# Patient Record
Sex: FEMALE | Race: WHITE
[De-identification: names, ages, dates, MRNs, and addresses within clinical notes are randomized per-mention and may not be internally consistent; named-entity substitution may affect disease eponyms.]

---

## 2020-09-10 ENCOUNTER — HOSPITAL ENCOUNTER (OUTPATIENT)
Dept: HOSPITAL 76 - LAB.WCP | Age: 40
Discharge: HOME | End: 2020-09-10
Attending: NURSE PRACTITIONER
Payer: MEDICARE

## 2020-09-10 DIAGNOSIS — K21.9: Primary | ICD-10-CM

## 2020-09-10 DIAGNOSIS — E66.9: ICD-10-CM

## 2020-09-10 DIAGNOSIS — Z79.899: ICD-10-CM

## 2020-09-10 DIAGNOSIS — R42: ICD-10-CM

## 2020-09-10 DIAGNOSIS — Z86.79: ICD-10-CM

## 2020-09-10 LAB
ALBUMIN DIAFP-MCNC: 4.2 G/DL (ref 3.2–5.5)
ALBUMIN/GLOB SERPL: 1.2 {RATIO} (ref 1–2.2)
ALP SERPL-CCNC: 54 IU/L (ref 42–121)
ALT SERPL W P-5'-P-CCNC: 20 IU/L (ref 10–60)
ANION GAP SERPL CALCULATED.4IONS-SCNC: 10 MMOL/L (ref 6–13)
AST SERPL W P-5'-P-CCNC: 19 IU/L (ref 10–42)
BASOPHILS NFR BLD AUTO: 0.1 10^3/UL (ref 0–0.1)
BASOPHILS NFR BLD AUTO: 0.9 %
BILIRUB BLD-MCNC: 0.4 MG/DL (ref 0.2–1)
BUN SERPL-MCNC: 15 MG/DL (ref 6–20)
CALCIUM UR-MCNC: 9.2 MG/DL (ref 8.5–10.3)
CHLORIDE SERPL-SCNC: 104 MMOL/L (ref 101–111)
CHOLEST SERPL-MCNC: 201 MG/DL
CO2 SERPL-SCNC: 22 MMOL/L (ref 21–32)
CREAT SERPLBLD-SCNC: 0.7 MG/DL (ref 0.4–1)
EOSINOPHIL # BLD AUTO: 0.1 10^3/UL (ref 0–0.7)
EOSINOPHIL NFR BLD AUTO: 0.8 %
ERYTHROCYTE [DISTWIDTH] IN BLOOD BY AUTOMATED COUNT: 15.6 % (ref 12–15)
GLOBULIN SER-MCNC: 3.4 G/DL (ref 2.1–4.2)
GLUCOSE SERPL-MCNC: 90 MG/DL (ref 70–100)
HDLC SERPL-MCNC: 57 MG/DL
HDLC SERPL: 3.5 {RATIO} (ref ?–4.4)
HGB UR QL STRIP: 11.8 G/DL (ref 12–16)
LDLC SERPL CALC-MCNC: 122 MG/DL
LDLC/HDLC SERPL: 2.1 {RATIO} (ref ?–4.4)
LYMPHOCYTES # SPEC AUTO: 2.3 10^3/UL (ref 1.5–3.5)
LYMPHOCYTES NFR BLD AUTO: 20.8 %
MCH RBC QN AUTO: 23.4 PG (ref 27–31)
MCHC RBC AUTO-ENTMCNC: 30 G/DL (ref 32–36)
MCV RBC AUTO: 77.8 FL (ref 81–99)
MONOCYTES # BLD AUTO: 0.8 10^3/UL (ref 0–1)
MONOCYTES NFR BLD AUTO: 6.8 %
NEUTROPHILS # BLD AUTO: 7.9 10^3/UL (ref 1.5–6.6)
NEUTROPHILS # SNV AUTO: 11.2 X10^3/UL (ref 4.8–10.8)
NEUTROPHILS NFR BLD AUTO: 70.4 %
PDW BLD AUTO: 11.2 FL (ref 7.9–10.8)
PLATELET # BLD: 320 10^3/UL (ref 130–450)
PROT SPEC-MCNC: 7.6 G/DL (ref 6.7–8.2)
RBC MAR: 5.05 10^6/UL (ref 4.2–5.4)
SODIUM SERPLBLD-SCNC: 136 MMOL/L (ref 135–145)
VLDLC SERPL-SCNC: 22 MG/DL

## 2020-09-10 PROCEDURE — 36415 COLL VENOUS BLD VENIPUNCTURE: CPT

## 2020-09-10 PROCEDURE — 85025 COMPLETE CBC W/AUTO DIFF WBC: CPT

## 2020-09-10 PROCEDURE — 80053 COMPREHEN METABOLIC PANEL: CPT

## 2020-09-10 PROCEDURE — 80061 LIPID PANEL: CPT

## 2020-09-10 PROCEDURE — 84443 ASSAY THYROID STIM HORMONE: CPT

## 2020-09-10 PROCEDURE — 83721 ASSAY OF BLOOD LIPOPROTEIN: CPT

## 2020-09-14 ENCOUNTER — HOSPITAL ENCOUNTER (OUTPATIENT)
Dept: HOSPITAL 76 - DI | Age: 40
Discharge: HOME | End: 2020-09-14
Attending: NURSE PRACTITIONER
Payer: COMMERCIAL

## 2020-09-14 DIAGNOSIS — M50.322: Primary | ICD-10-CM

## 2020-09-14 PROCEDURE — 72040 X-RAY EXAM NECK SPINE 2-3 VW: CPT

## 2020-09-14 NOTE — XRAY REPORT
PROCEDURE:  Cervical Spine 2 View

 

INDICATIONS:  NECK PAIN

 

TECHNIQUE:  4 view(s) of the cervical spine were acquired.  

 

COMPARISON:  None.

 

FINDINGS:  

 

Bones:  No fractures or dislocations to the T1 level.  The lateral masses of C1 appear intact on the 
odontoid view.  No suspicious bony lesions.  There is moderate C5-6 degenerative disc disease.

 

Soft tissues:  No prevertebral soft tissue swelling.  

 

IMPRESSION:  Moderate C5-6 degenerative disc disease, no fracture or subluxation present.

 

Reviewed by: Renny Blue MD on 9/14/2020 3:32 PM PDT

Approved by: Renny Blue MD on 9/14/2020 3:32 PM PDT

 

 

Station ID:  [id number]

## 2020-09-21 ENCOUNTER — HOSPITAL ENCOUNTER (OUTPATIENT)
Dept: HOSPITAL 76 - DI.N | Age: 40
Discharge: HOME | End: 2020-09-21
Attending: NURSE PRACTITIONER
Payer: MEDICARE

## 2020-09-21 DIAGNOSIS — R92.8: ICD-10-CM

## 2020-09-21 DIAGNOSIS — Z12.31: Primary | ICD-10-CM

## 2020-09-21 PROCEDURE — 77067 SCR MAMMO BI INCL CAD: CPT

## 2020-09-21 PROCEDURE — 77063 BREAST TOMOSYNTHESIS BI: CPT

## 2020-09-23 NOTE — MAMMOGRAPHY REPORT
BILATERAL DIGITAL SCREENING MAMMOGRAM 3D/2D: 9/21/2020

CLINICAL: Baseline exam.  

 

No prior exams were available for comparison.  The tissue of both breasts is predominantly fatty.  

 

 

There is a 1 cm oval focal asymmetry in the left breast central to the nipple middle depth.  

No other significant masses, calcifications, or other findings are seen in either breast.  

 

IMPRESSION: INCOMPLETE: NEEDS ADDITIONAL IMAGING EVALUATION

The 1 cm oval focal asymmetry in the left breast is indeterminate.  Additional views with possible ul
trasound are recommended.  

 

 

 

This exam was interpreted at Station ID: [id number].  

 

NOTE: For mammograms, a report in lay terms will be sent to the patient. Approximately 15% of breast 
malignancies will not be visualized mammographically. In the management of a palpable breast mass, a 
negative mammogram must not discourage biopsy of a clinically suspicious lesion.

 

Electronically Signed By: Maxx Mayo M.D.          

aty/:9/21/2020 18:39:58  

 

 

 

ACR BI-RADS Category 0: Incomplete 3340F

PARENCHYMAL PATTERN: (F) - The breast(s) demonstrate(s) diffuse fatty replacement.

BI-RADS CATEGORY: (0) - 0

Mammo and US

[id number]

Immediate follow-up

LATERALITY: (L)

## 2020-10-08 ENCOUNTER — HOSPITAL ENCOUNTER (OUTPATIENT)
Dept: HOSPITAL 76 - LAB | Age: 40
Discharge: HOME | End: 2020-10-08
Attending: NURSE PRACTITIONER
Payer: COMMERCIAL

## 2020-10-08 DIAGNOSIS — R30.0: Primary | ICD-10-CM

## 2020-10-08 DIAGNOSIS — R71.8: ICD-10-CM

## 2020-10-08 DIAGNOSIS — M54.2: ICD-10-CM

## 2020-10-08 LAB
CLARITY UR REFRACT.AUTO: (no result)
CRP SERPL-MCNC: < 1 MG/DL (ref 0–1)
GLUCOSE UR QL STRIP.AUTO: NEGATIVE MG/DL
IRON SATN MFR SERPL: 5 % (ref 20–50)
IRON SERPL-MCNC: 26 UG/DL (ref 28–170)
KETONES UR QL STRIP.AUTO: NEGATIVE MG/DL
MUCOUS THREADS URNS QL MICRO: (no result)
NITRITE UR QL STRIP.AUTO: NEGATIVE
PH UR STRIP.AUTO: 5 PH (ref 5–7.5)
PROT UR STRIP.AUTO-MCNC: NEGATIVE MG/DL
RBC # UR STRIP.AUTO: NEGATIVE /UL
RBC # URNS HPF: (no result) /HPF (ref 0–5)
SP GR UR STRIP.AUTO: >=1.03 (ref 1–1.03)
SQUAMOUS URNS QL MICRO: (no result)
TIBC SERPL-MCNC: 536 UG/DL (ref 250–450)
TRANSFERRIN SERPL-MCNC: 383 MG/DL (ref 192–382)
UROBILINOGEN UR QL STRIP.AUTO: (no result) E.U./DL
UROBILINOGEN UR STRIP.AUTO-MCNC: NEGATIVE MG/DL
YEAST URNS QL MICRO: PRESENT

## 2020-10-08 PROCEDURE — 85651 RBC SED RATE NONAUTOMATED: CPT

## 2020-10-08 PROCEDURE — 36415 COLL VENOUS BLD VENIPUNCTURE: CPT

## 2020-10-08 PROCEDURE — 86140 C-REACTIVE PROTEIN: CPT

## 2020-10-08 PROCEDURE — 83540 ASSAY OF IRON: CPT

## 2020-10-08 PROCEDURE — 82728 ASSAY OF FERRITIN: CPT

## 2020-10-08 PROCEDURE — 87086 URINE CULTURE/COLONY COUNT: CPT

## 2020-10-08 PROCEDURE — 84466 ASSAY OF TRANSFERRIN: CPT

## 2020-10-08 PROCEDURE — 81001 URINALYSIS AUTO W/SCOPE: CPT

## 2020-10-26 ENCOUNTER — HOSPITAL ENCOUNTER (OUTPATIENT)
Dept: HOSPITAL 76 - LAB | Age: 40
Discharge: HOME | End: 2020-10-26
Attending: NURSE PRACTITIONER
Payer: COMMERCIAL

## 2020-10-26 ENCOUNTER — HOSPITAL ENCOUNTER (OUTPATIENT)
Dept: HOSPITAL 76 - DI | Age: 40
Discharge: HOME | End: 2020-10-26
Attending: NURSE PRACTITIONER
Payer: COMMERCIAL

## 2020-10-26 DIAGNOSIS — R30.0: ICD-10-CM

## 2020-10-26 DIAGNOSIS — R92.8: Primary | ICD-10-CM

## 2020-10-26 DIAGNOSIS — R30.0: Primary | ICD-10-CM

## 2020-10-26 LAB
CLARITY UR REFRACT.AUTO: CLEAR
GLUCOSE UR QL STRIP.AUTO: NEGATIVE MG/DL
KETONES UR QL STRIP.AUTO: NEGATIVE MG/DL
MUCOUS THREADS URNS QL MICRO: (no result)
NITRITE UR QL STRIP.AUTO: NEGATIVE
PH UR STRIP.AUTO: 5 PH (ref 5–7.5)
PROT UR STRIP.AUTO-MCNC: NEGATIVE MG/DL
RBC # UR STRIP.AUTO: NEGATIVE /UL
SP GR UR STRIP.AUTO: >=1.03 (ref 1–1.03)
SQUAMOUS URNS QL MICRO: (no result)
UROBILINOGEN UR QL STRIP.AUTO: (no result) E.U./DL
UROBILINOGEN UR STRIP.AUTO-MCNC: NEGATIVE MG/DL

## 2020-10-26 PROCEDURE — 87086 URINE CULTURE/COLONY COUNT: CPT

## 2020-10-26 PROCEDURE — 81001 URINALYSIS AUTO W/SCOPE: CPT

## 2020-10-26 PROCEDURE — 76642 ULTRASOUND BREAST LIMITED: CPT

## 2020-10-27 NOTE — ULTRASOUND REPORT
LIMITED ULTRASOUND OF LEFT BREAST: 10/26/2020

CLINICAL: Patient returns today to evaluate a focal asymmetry in the left breast.  

 

Comparison is made to exams dated:  10/26/2020 mammogram and 9/21/2020 mammogram - [facility].  

 Real-time ultrasound of the left breast 12 o'clock region was performed.  Gray scale images of the r
eal-time examination were reviewed.  

 

No significant abnormalities were seen sonographically in the left breast. Specifically, no finding t
o correspond to the patient's less prominent screening mammographic abnormality.

 

 

 

IMPRESSION: PROBABLY BENIGN 

There is no sonographic correlate to the patient's screening abnormality and no evidence of malignanc
y.  

 A follow-up left mammogram in 6 months is recommended to demonstrate stability.   

Findings and recommendations were conveyed to the patient at time of exam.

 

 

This exam was interpreted at Station ID: [id number].  

Electronically Signed By: Keiry vega/:10/26/2020 11:07:24  

 

 

 

Ultrasound BI-RADS: 3 Probably benign

BI-RADS CATEGORY: (3) - 3

Mammogram

[id number]

6 month follow-up

LATERALITY: (L)

## 2020-10-27 NOTE — MAMMOGRAPHY REPORT
UNILATERAL LEFT DIGITAL DIAGNOSTIC MAMMOGRAM 3D/2D: 10/26/2020

CLINICAL: Patient returns today to evaluate a focal asymmetry in the left breast.  

 

Comparison is made to exam dated:  9/21/2020 mammogram - [facility].  The tissue of
 left breast is predominantly fatty.  

 

There is a 7 mm oval low density asymmetry in the left breast central to the nipple middle depth.  Th
is is seen in additional views and is less prominent compared to screening.  

No other significant masses or calcifications are seen in the breast.  

 

IMPRESSION: INCOMPLETE: NEEDS ADDITIONAL IMAGING EVALUATION

The 7 mm oval low density asymmetry in the left breast most likely is a cyst but remains indeterminat
e.  An ultrasound is recommended.  This was performed immediately following this exam. 

 

 

 

This exam was interpreted at Station ID: [id number].  

 

NOTE: For mammograms, a report in lay terms will be sent to the patient. Approximately 15% of breast 
malignancies will not be visualized mammographically. In the management of a palpable breast mass, a 
negative mammogram must not discourage biopsy of a clinically suspicious lesion.

 

Electronically Signed By: Keiry vega/:10/26/2020 10:02:29  

 

 

 

ACR BI-RADS Category 0: Incomplete 3340F

PARENCHYMAL PATTERN: (F) - The breast(s) demonstrate(s) diffuse fatty replacement.

BI-RADS CATEGORY: (0) - 0

Ultrasound

[id number]

Immediate follow-up

LATERALITY: (B)

## 2020-10-29 ENCOUNTER — HOSPITAL ENCOUNTER (OUTPATIENT)
Dept: HOSPITAL 76 - DI | Age: 40
Discharge: HOME | End: 2020-10-29
Attending: NURSE PRACTITIONER
Payer: COMMERCIAL

## 2020-10-29 DIAGNOSIS — Z86.11: ICD-10-CM

## 2020-10-29 DIAGNOSIS — R30.0: Primary | ICD-10-CM

## 2020-10-29 LAB
CLARITY UR REFRACT.AUTO: CLEAR
GLUCOSE UR QL STRIP.AUTO: NEGATIVE MG/DL
KETONES UR QL STRIP.AUTO: NEGATIVE MG/DL
NITRITE UR QL STRIP.AUTO: NEGATIVE
PH UR STRIP.AUTO: 5.5 PH (ref 5–7.5)
PROT UR STRIP.AUTO-MCNC: NEGATIVE MG/DL
RBC # UR STRIP.AUTO: (no result) /UL
RBC # URNS HPF: (no result) /HPF (ref 0–5)
SP GR UR STRIP.AUTO: >=1.03 (ref 1–1.03)
SQUAMOUS URNS QL MICRO: (no result)
UROBILINOGEN UR QL STRIP.AUTO: (no result) E.U./DL
UROBILINOGEN UR STRIP.AUTO-MCNC: NEGATIVE MG/DL

## 2020-10-29 PROCEDURE — 81001 URINALYSIS AUTO W/SCOPE: CPT

## 2020-10-29 PROCEDURE — 71046 X-RAY EXAM CHEST 2 VIEWS: CPT

## 2020-10-29 PROCEDURE — 87086 URINE CULTURE/COLONY COUNT: CPT

## 2020-10-30 NOTE — XRAY REPORT
PROCEDURE:  Chest 2 View X-Ray

 

INDICATIONS:  PERSONAL HISTORY OF TUBERCULOSIS

 

TECHNIQUE:  2 view(s) of the chest.  

 

COMPARISON:  None.

 

FINDINGS:  

 

Surgical changes and devices:  None.  

 

Lungs and pleura:  No pleural effusions or pneumothorax.  Lungs are clear.  

 

Mediastinum:  Mediastinal contours are normal.  Heart size is normal.  

 

Bones and chest wall:  No suspicious bony abnormalities.  Soft tissues appear unremarkable.  

 

IMPRESSION:  There is no evidence of prior tuberculosis, no lung scarring is present, no mediastinal 
or hilar adenopathy is found. The study is considered normal for age.

 

Reviewed by: Renny Blue MD on 10/30/2020 8:39 AM PDT

Approved by: Renny Blue MD on 10/30/2020 8:39 AM PDT

 

 

Station ID:  [id number]

## 2020-11-06 ENCOUNTER — HOSPITAL ENCOUNTER (OUTPATIENT)
Dept: HOSPITAL 76 - DI | Age: 40
Discharge: HOME | End: 2020-11-06
Attending: NURSE PRACTITIONER
Payer: COMMERCIAL

## 2020-11-06 DIAGNOSIS — M50.321: ICD-10-CM

## 2020-11-06 DIAGNOSIS — M48.02: ICD-10-CM

## 2020-11-06 DIAGNOSIS — M47.812: Primary | ICD-10-CM

## 2020-11-06 PROCEDURE — 72141 MRI NECK SPINE W/O DYE: CPT

## 2020-11-06 NOTE — MRI REPORT
PROCEDURE:  Cervical Spine W/O

 

INDICATIONS:  CERVICAL RADICULOPATHY, NECK PAIN

 

TECHNIQUE:  

Noncontrast sagittal T1 spin echo and T2 fast spin echo, sagittal STIR, foraminal oblique sagittal T2
 fast spin echo, and axial gradient echo or T2 fast spin echo through the cervical spine.  

 

COMPARISON:  Correlation is made with prior cervical spine radiographs, 9/14/2020

 

FINDINGS:  

Image quality:   Motion artifact is noted.   The oblique images are not properly oriented.

 

Alignment and Curvature:  There is normal bony alignment.  

 

Bone Marrow:  Marrow demonstrates normal overall signal.  

 

Spinal Cord:  Visualized spinal cord has normal size and signal.  No cerebellar tonsillar herniation.
  

 

Paraspinous Soft Tissues:  No paravertebral masses.  Prevertebral soft tissues are normal in thicknes
s.  

 

C2-C3:  Normal in appearance.  

 

C3-C4:   Normal in appearance.

 

C4-C5:  The disc height and disc signal are relatively well-preserved.  Mild disc osteophyte complex 
is seen.   Mild facet hypertrophy is seen.   Mild bilateral neural foraminal narrowing is seen.  Mild
 to moderate central canal narrowing is seen.

 

C5-C6:  Moderate loss of disc height and signal are seen. Moderate disc osteophyte complex is seen, w
ith a central disc osteophyte protrusion, as on series 701 image 12.  Mild facet hypertrophy is seen.
   Moderate bilateral neural foraminal narrowing is seen.   Moderate to severe central canal narrowin
g is seen.  Associated mass effect is seen upon the ventral spinal cord.  

 

C6-C7:  The disc height and disc signal are relatively well-preserved. Mild disc osteophyte complex i
s seen, with a mild central disc protrusion. No significant neuroforaminal narrowing is seen. Mild to
 moderate central canal narrowing is seen, with minimal mass effect upon the ventral spinal cord, as 
on series 7 and on one image 8.

 

C7-T1:  Normal in appearance.

 

 

 

IMPRESSION:  

 

Premature cervical spine degenerative changes are seen, which are worst at the C5-C6 level, where the
re is a central disc osteophyte protrusion, with associated moderate to severe central canal narrowin
g and ventral cord flattening.

 

Reviewed by: Jimmy Meyers MD on 11/6/2020 5:38 PM AKST

Approved by: Jimmy Meyers MD on 11/6/2020 5:38 PM AK

 

 

Station ID:  [id number]

## 2020-11-21 ENCOUNTER — HOSPITAL ENCOUNTER (EMERGENCY)
Dept: HOSPITAL 76 - ED | Age: 40
Discharge: HOME | End: 2020-11-21
Payer: COMMERCIAL

## 2020-11-21 VITALS — DIASTOLIC BLOOD PRESSURE: 64 MMHG | SYSTOLIC BLOOD PRESSURE: 115 MMHG

## 2020-11-21 DIAGNOSIS — I82.431: Primary | ICD-10-CM

## 2020-11-21 DIAGNOSIS — I82.451: ICD-10-CM

## 2020-11-21 DIAGNOSIS — I82.443: ICD-10-CM

## 2020-11-21 PROCEDURE — 93970 EXTREMITY STUDY: CPT

## 2020-11-21 PROCEDURE — 99284 EMERGENCY DEPT VISIT MOD MDM: CPT

## 2020-11-21 NOTE — ULTRASOUND REPORT
PROCEDURE:  Duplex Ext Veins Bilateral

 

INDICATIONS:  LEANNA YOVANNY

 

TECHNIQUE:  

Real-time imaging, as well as color and pulse Doppler interrogation, were performed of the deep veins
 of both legs from the inguinal ligament to the popliteal fossa.  

 

COMPARISON:  None available

 

FINDINGS: On the right, deep venous thrombosis involves the popliteal vein and extends into the poste
rior tibial veins and peroneal veins. No more proximal deep venous thrombosis is seen.

 

On the left, deep venous thrombosis is seen involving a single posterior tibial vein. No above-the-kn
ee deep venous thrombosis is seen.

 

This study is limited by patient tenderness, with inability to tolerate compressions within the calf.


 

 

 

IMPRESSION:  

 

Bilateral lower extremity venous thrombosis, which is more extensive on the right than on the left.

 

 

Reviewed by: Jimmy Meyers MD on 11/21/2020 12:20 PM [facility]

Approved by: Jimmy Meyers MD on 11/21/2020 12:20 PM [facility]

 

 

Station ID:  [id number]

## 2020-11-21 NOTE — ED PHYSICIAN DOCUMENTATION
PD HPI LOWER EXT INJURY





- Stated complaint


Stated Complaint: R LEG PAIN





- Chief complaint


Chief Complaint: Ext Problem





- History obtained from


History obtained from: Patient





- History of Present Illness


PD HPI LOW EXT INJURY LOCATION: Right, Calf


Type of injury: Other (She had had right foot surgery in August and that seemed 

to recover okay.  Left foot surgery in September and still is in a walking boot.

 Had left calf pain 4 days ago for a day and then improved.  Right calf pain now

for 3 days, increasing.)


Timing - details: Gradual onset


Worsened by: Palpating, Other (walking)


Associated symptoms: No: Weakness, Numbness, Swelling


Contributing factors: Other (She started oral contraceptives a month ago.).  No:

Anticoagulated


Similar symptoms before: Has not had sx before





Review of Systems


Constitutional: denies: Fever


Nose: denies: Rhinorrhea / runny nose, Congestion, Epistaxis


Throat: denies: Sore throat


Cardiac: reports: Calf pain.  denies: Chest pain / pressure, Palpitations, Pedal

edema


Respiratory: denies: Dyspnea, Cough, Hemoptysis


GI: denies: Nausea, Vomiting, Diarrhea, Bloody / black stool


Endocrine: denies: Weight loss, Easy bruising / bleeding





PD PAST MEDICAL HISTORY





- Past Medical History


Cardiovascular: None


Respiratory: None


Neuro: None


Endocrine/Autoimmune: None





- Past Surgical History


Ortho: Other (foot surgery September left foot by Podiatrist, right foot month 

or so prior to that. )





- Present Medications


Home Medications: 


                                Ambulatory Orders











 Medication  Instructions  Recorded  Confirmed


 


Rivaroxaban [Xarelto] 15 mg PO BID #42 tablet 11/21/20 














- Allergies


Allergies/Adverse Reactions: 


                                    Allergies











Allergy/AdvReac Type Severity Reaction Status Date / Time


 


No Known Drug Allergies Allergy   Verified 11/21/20 09:49














- Living Situation


Living Arrangement: reports: At home





- Social History


Does the pt smoke?: No


Does the pt have substance abuse?: No





- Family History


Family history: reports: Other (The patient states her mother and couple of 

other family members have had blood clots in the past.  No specific diagnosis of

 clotting abnormalities.)





PD ED PE NORMAL





- Vitals


Vital signs reviewed: Yes





- General


General: Alert and oriented X 3, No acute distress, Well developed/nourished





- Neck


Neck: Supple, no meningeal sign, No adenopathy





- Cardiac


Cardiac: RRR, No murmur





- Respiratory


Respiratory: Clear bilaterally





- Abdomen


Abdomen: Soft, Non tender





- Derm


Derm: Normal color, Warm and dry





- Extremities


Extremities: No edema, Other (Mild calf tenderness on the medial mid left calf. 

 There is moderate calf tenderness on the right from just above the ankle to the

 medial thigh.  No redness or warmth.  No edema in the leg.)





- Neuro


Neuro: Alert and oriented X 3, No motor deficit, No sensory deficit, Normal 

speech





Results





- Vitals


Vitals: 


                               Vital Signs - 24 hr











  11/21/20 11/21/20





  09:45 13:28


 


Temperature 37.1 C 36.7 C


 


Heart Rate 83 65


 


Respiratory 18 18





Rate  


 


Blood Pressure 120/67 115/64


 


O2 Saturation 99 95








                                     Oxygen











O2 Source                      Room air

















- Rads (name of study)


  ** bilateral Duplex veins


Radiology: Prelim report reviewed (DVT moderate right side. Small DVT left lower

 leg. ), See rad report





PD MEDICAL DECISION MAKING





- ED course


Complexity details: re-evaluated patient (We discussed the treatment and 

diagnosis.  I did verbally tell her to stop her oral contraceptives.  Not to use

 any aspirin or NSAIDs.  We will start the anticoagulant.  She can add Tylenol 

for pain.  She declined stronger pain meds from me and does have some opioid 

left over from her foot surgery i), considered differential, d/w patient





Departure





- Departure


Disposition: 01 Home, Self Care


Clinical Impression: 


DVT (deep venous thrombosis)


Qualifiers:


 DVT location: lower extremity Affected thrombotic vein of extremity: 

unspecified vein of extremity Chronicity: acute Laterality: bilateral Qualified 

Code(s): I82.403 - Acute embolism and thrombosis of unspecified deep veins of 

lower extremity, bilateral





Condition: Stable


Record reviewed to determine appropriate education?: Yes


Instructions:  ED DVT


Follow-Up: 


Adrianna Haider ARNP, NP-C [Primary Care Provider] - 


Prescriptions: 


Rivaroxaban [Xarelto] 15 mg PO BID #42 tablet


Comments: 


Warm moist compresses to the calves area to and promote good blood flow.  

Continue walking and regular use and activity.  Use Tylenol if needed for pains 

every 4-6 hours.





Do not use any aspirin or NSAIDs as it will add further blood thinning effect to

 the anticoagulant.





Xarelto twice daily for 3 weeks.  They will then change to once daily for that 

at a different dose.  I will leave that to your primary care to  the 

prescription at that point.





Follow-up with your primary care in the next week or so for recheck on the 

current symptoms.





Return if worsening pain or swelling in the legs or any development of chest 

pain or trouble breathing.


Discharge Date/Time: 11/21/20 13:36

## 2020-11-23 ENCOUNTER — HOSPITAL ENCOUNTER (EMERGENCY)
Dept: HOSPITAL 76 - ED | Age: 40
Discharge: HOME | End: 2020-11-23
Payer: COMMERCIAL

## 2020-11-23 VITALS — SYSTOLIC BLOOD PRESSURE: 112 MMHG | DIASTOLIC BLOOD PRESSURE: 50 MMHG

## 2020-11-23 DIAGNOSIS — Z79.01: ICD-10-CM

## 2020-11-23 DIAGNOSIS — R51.9: Primary | ICD-10-CM

## 2020-11-23 DIAGNOSIS — Z86.718: ICD-10-CM

## 2020-11-23 PROCEDURE — 99284 EMERGENCY DEPT VISIT MOD MDM: CPT

## 2020-11-23 PROCEDURE — 70450 CT HEAD/BRAIN W/O DYE: CPT

## 2020-11-23 NOTE — CT REPORT
PROCEDURE:  HEAD WO

 

INDICATIONS:  Headache, takes xarelto

 

TECHNIQUE:  

Noncontrast 4.5 mm thick angled axial sections acquired from the foramen magnum to the vertex.  For r
adiation dose reduction, the following was used:  automated exposure control, adjustment of mA and/or
 kV according to patient size.

 

COMPARISON:  None.

 

FINDINGS:  

Image quality:  Excellent.  

 

CSF spaces:  Basal cisterns are patent.  No extra-axial fluid collections.  Ventricles are normal in 
size and shape.  

 

Brain:  No midline shift.  No intracranial masses or hemorrhage.  Gray-white matter interface is norm
al.  

 

Skull and face:  Calvarium and visualized facial bones are intact, without suspicious lesions.  

 

Sinuses:  Visualized sinuses and mastoids are clear.  

 

IMPRESSION:  No acute intracranial finding.

 

Reviewed by: Brandyn Beebe MD on 11/23/2020 1:13 PM PST

Approved by: Brandyn Beebe MD on 11/23/2020 1:13 PM [facility]

 

 

Station ID:  [id number]

## 2020-11-23 NOTE — ED PHYSICIAN DOCUMENTATION
PD HPI HEADACHE





- Stated complaint


Stated Complaint: HA





- Chief complaint


Chief Complaint: Neuro





- History obtained from


History obtained from: Patient





- History of Present Illness


Timing - onset: How many days ago (2)


Timing - onset during: Rest


Pain level max: 7


Pain level now: 4


Worst headache ever?: No: Worst headache ever?


Location: Global


Quality: Aching


Associated symptoms: No: Fever, Stiff neck, Nausea, Vomiting, Weakness, Numbness


Improved by: Rest


Worsened by: Light, Noise


Recently seen: Not recently seen





- Additional information


Additional information: 





Patient is a 40-year-old female who presents with intermittent headaches for the

past 2 days since starting Xarelto.  She has also been on Vicodin for pain in 

her calves.  Patient denies any recent head injury or trauma.  She called her 

doctor who told her to come to the emergency department to have a CT scan of her

head due to the recent starting of Xarelto for calf DVTs. Worse with light and 

sound.  Similar to prior headaches. No vomiting





Review of Systems


Ten Systems: 10 systems reviewed and negative


Constitutional: denies: Fever, Chills


Ears: denies: Ear pain


Nose: denies: Rhinorrhea / runny nose, Congestion


Throat: denies: Sore throat


Respiratory: denies: Cough


GI: denies: Vomiting, Diarrhea


Skin: denies: Rash


Musculoskeletal: denies: Neck pain, Back pain


Neurologic: denies: Focal weakness, Numbness





PD PAST MEDICAL HISTORY





- Past Medical History


Past Medical History: Yes


Cardiovascular: None


Respiratory: None


Neuro: None


Endocrine/Autoimmune: None





- Past Surgical History


Ortho: Other





- Present Medications


Home Medications: 


                                Ambulatory Orders











 Medication  Instructions  Recorded  Confirmed


 


Rivaroxaban [Xarelto] 15 mg PO BID #42 tablet 11/21/20 














- Allergies


Allergies/Adverse Reactions: 


                                    Allergies











Allergy/AdvReac Type Severity Reaction Status Date / Time


 


No Known Drug Allergies Allergy   Verified 11/23/20 11:00














- Social History


Does the pt smoke?: No


Smoking Status: Never smoker


Does the pt have substance abuse?: No





PD ED PE NORMAL





- Vitals


Vital signs reviewed: Yes





- General


General: Alert and oriented X 3, No acute distress, Well developed/nourished





- HEENT


HEENT: Atraumatic, PERRL, Moist mucous membranes





- Neck


Neck: Supple, no meningeal sign





- Cardiac


Cardiac: RRR, Strong equal pulses





- Respiratory


Respiratory: No respiratory distress, Clear bilaterally





- Abdomen


Abdomen: Soft, Non tender, Non distended





- Back


Back: No spinal TTP





- Derm


Derm: Warm and dry





- Extremities


Extremities: No edema





- Neuro


Neuro: Alert and oriented X 3, CNs 2-12 intact, No motor deficit, No sensory 

deficit, Normal speech





- Psych


Psych: Normal mood, Normal affect





Results





- Vitals


Vitals: 


                               Vital Signs - 24 hr











  11/23/20 11/23/20





  10:55 13:41


 


Temperature 36.9 C 


 


Heart Rate 95 73


 


Respiratory 18 18





Rate  


 


Blood Pressure 95/76 112/50 L


 


O2 Saturation 97 100








                                     Oxygen











O2 Source                      Room air

















- Rads (name of study)


  ** head CT 


Radiology: Prelim report reviewed, EMP read contemporaneously, See rad report 

(no acute abnormality)





PD MEDICAL DECISION MAKING





- ED course


Complexity details: reviewed results, re-evaluated patient, considered 

differential, d/w patient


ED course: 





40-year-old female presents to the emergency department with a headache today.  

PCP sent her here for possible intracranial hemorrhage as she recently started 

Xarelto.  Negative head CT.  Headache resolved with Fioricet.  We will have her 

follow-up with her doctor for further care.  Patient counseled regarding signs 

and symptoms for which I believe and urgent re-evaluation would be necessary. 

Patient with good understanding of and agreement to plan and is comfortable 

going home at this time





This document was made in part using voice recognition software. While efforts 

are made to proofread this document, sound alike and grammatical errors may 

occur.





Departure





- Departure


Disposition: 01 Home, Self Care


Clinical Impression: 


Headache


Qualifiers:


 Headache type: unspecified Headache chronicity pattern: acute headache 

Intractability: not intractable Qualified Code(s): R51.9 - Headache, unspecified





Condition: Good


Instructions:  ED Cephalgia Unspecified


Follow-Up: 


Adrianna Haider ARNP, NP-C [Primary Care Provider] - Within 1 week


Comments: 


Your head CT does not show any acute abnormalities today. Return if you worsen. 

Follow up with your doctor for further care.


Discharge Date/Time: 11/23/20 13:41

## 2020-12-01 ENCOUNTER — HOSPITAL ENCOUNTER (OUTPATIENT)
Dept: HOSPITAL 76 - LAB.R | Age: 40
Discharge: HOME | End: 2020-12-01
Attending: FAMILY MEDICINE
Payer: COMMERCIAL

## 2020-12-01 DIAGNOSIS — R30.0: Primary | ICD-10-CM

## 2020-12-01 LAB
CLARITY UR REFRACT.AUTO: CLEAR
GLUCOSE UR QL STRIP.AUTO: NEGATIVE MG/DL
KETONES UR QL STRIP.AUTO: NEGATIVE MG/DL
NITRITE UR QL STRIP.AUTO: NEGATIVE
PH UR STRIP.AUTO: 5.5 PH (ref 5–7.5)
PROT UR STRIP.AUTO-MCNC: NEGATIVE MG/DL
RBC # UR STRIP.AUTO: (no result) /UL
RBC # URNS HPF: (no result) /HPF (ref 0–5)
SP GR UR STRIP.AUTO: 1.02 (ref 1–1.03)
SQUAMOUS URNS QL MICRO: (no result)
UROBILINOGEN UR QL STRIP.AUTO: (no result) E.U./DL
UROBILINOGEN UR STRIP.AUTO-MCNC: NEGATIVE MG/DL

## 2020-12-01 PROCEDURE — 87086 URINE CULTURE/COLONY COUNT: CPT

## 2020-12-01 PROCEDURE — 81001 URINALYSIS AUTO W/SCOPE: CPT
